# Patient Record
Sex: FEMALE | Race: WHITE | Employment: UNEMPLOYED | URBAN - METROPOLITAN AREA
[De-identification: names, ages, dates, MRNs, and addresses within clinical notes are randomized per-mention and may not be internally consistent; named-entity substitution may affect disease eponyms.]

---

## 2017-04-04 ENCOUNTER — OFFICE VISIT (OUTPATIENT)
Dept: ORTHOPEDIC SURGERY | Age: 77
End: 2017-04-04

## 2017-04-04 ENCOUNTER — ORTHOTIC/BRACE ENCOUNTER (OUTPATIENT)
Dept: ORTHOPEDIC SURGERY | Age: 77
End: 2017-04-04

## 2017-04-04 VITALS — HEIGHT: 63 IN | BODY MASS INDEX: 25.7 KG/M2 | WEIGHT: 145.06 LBS

## 2017-04-04 DIAGNOSIS — M17.11 PRIMARY OSTEOARTHRITIS OF RIGHT KNEE: Primary | ICD-10-CM

## 2017-04-04 PROCEDURE — 99213 OFFICE O/P EST LOW 20 MIN: CPT | Performed by: ORTHOPAEDIC SURGERY

## 2017-04-12 ENCOUNTER — OFFICE VISIT (OUTPATIENT)
Dept: ORTHOPEDIC SURGERY | Age: 77
End: 2017-04-12

## 2017-04-12 VITALS
DIASTOLIC BLOOD PRESSURE: 81 MMHG | HEART RATE: 65 BPM | SYSTOLIC BLOOD PRESSURE: 133 MMHG | WEIGHT: 145.06 LBS | HEIGHT: 63 IN | BODY MASS INDEX: 25.7 KG/M2

## 2017-04-12 DIAGNOSIS — M19.172 POST-TRAUMATIC OSTEOARTHRITIS OF LEFT ANKLE: Primary | ICD-10-CM

## 2017-04-12 PROCEDURE — 99213 OFFICE O/P EST LOW 20 MIN: CPT | Performed by: ORTHOPAEDIC SURGERY

## 2017-04-12 PROCEDURE — 20605 DRAIN/INJ JOINT/BURSA W/O US: CPT | Performed by: ORTHOPAEDIC SURGERY

## 2017-06-01 LAB
ANION GAP SERPL CALCULATED.3IONS-SCNC: 9.4 MMOL/L (ref 8–16)
BUN BLDV-MCNC: 11 MG/DL (ref 5–19)
CALCIUM SERPL-MCNC: 9.7 MG/DL (ref 8.4–10.2)
CHLORIDE BLD-SCNC: 99 MMOL/L (ref 99–111)
CO2: 31 MMOL/L (ref 21–33)
CREAT SERPL-MCNC: 0.8 MG/DL (ref 0.6–1.3)
ERYTHROCYTE DISTRIBUTION WIDTH RBC RATIO: 14.1 % (ref 11.6–14.8)
GFR CALCULATED: 70
GLUCOSE BLD-MCNC: 114 MG/DL (ref 74–99)
HCT VFR BLD CALC: 41.6 % (ref 35.7–46.7)
HEMOGLOBIN: 13.3 G/DL (ref 11.9–15.9)
MCH RBC QN AUTO: 27.8 PG (ref 28.1–33.6)
MCHC RBC AUTO-ENTMCNC: 32 G/DL (ref 32.8–34.7)
MCV RBC AUTO: 86.8 FL (ref 82.9–99.9)
PLATELETS: 275 X1000 (ref 175–420)
POTASSIUM SERPL-SCNC: 3.4 MMOL/L (ref 3.4–5)
RBC # BLD: 4.79 X1000000 (ref 3.72–5.31)
SODIUM BLD-SCNC: 136 MMOL/L (ref 136–146)
WBC # BLD: 9.3 X1000 (ref 4.5–10.3)

## 2017-06-03 LAB
Lab: NORMAL
URINE CULTURE, ROUTINE: NORMAL

## 2017-06-20 ENCOUNTER — PROCEDURE VISIT (OUTPATIENT)
Dept: ORTHOPEDIC SURGERY | Age: 77
End: 2017-06-20

## 2017-06-20 DIAGNOSIS — M17.0 PRIMARY OSTEOARTHRITIS OF BOTH KNEES: Primary | ICD-10-CM

## 2017-06-20 PROCEDURE — 99999 PR OFFICE/OUTPT VISIT,PROCEDURE ONLY: CPT | Performed by: ORTHOPAEDIC SURGERY

## 2017-06-26 ENCOUNTER — TELEPHONE (OUTPATIENT)
Dept: ORTHOPEDIC SURGERY | Age: 77
End: 2017-06-26

## 2017-07-05 ENCOUNTER — TELEPHONE (OUTPATIENT)
Dept: ORTHOPEDIC SURGERY | Age: 77
End: 2017-07-05

## 2017-07-05 LAB
ABO GROUPING: NORMAL
ANION GAP SERPL CALCULATED.3IONS-SCNC: 10 MMOL/L (ref 8–16)
ANTIBODY SCREEN: NEGATIVE
BUN BLDV-MCNC: 14 MG/DL (ref 5–19)
CALCIUM SERPL-MCNC: 9.8 MG/DL (ref 8.4–10.2)
CHLORIDE BLD-SCNC: 100 MMOL/L (ref 99–111)
CO2: 32 MMOL/L (ref 21–33)
CREAT SERPL-MCNC: 0.9 MG/DL (ref 0.6–1.3)
ERYTHROCYTE DISTRIBUTION WIDTH RBC RATIO: 13.9 % (ref 11.6–14.8)
GFR CALCULATED: 61
GLUCOSE BLD-MCNC: 123 MG/DL (ref 74–99)
HCT VFR BLD CALC: 38.8 % (ref 35.7–46.7)
HEMOGLOBIN: 12.7 G/DL (ref 11.9–15.9)
MCH RBC QN AUTO: 28.1 PG (ref 28.1–33.6)
MCHC RBC AUTO-ENTMCNC: 32.7 G/DL (ref 32.8–34.7)
MCV RBC AUTO: 85.8 FL (ref 82.9–99.9)
PLATELETS: 248 X1000 (ref 175–420)
POTASSIUM SERPL-SCNC: 3.4 MMOL/L (ref 3.4–5)
RBC # BLD: 4.52 X1000000 (ref 3.72–5.31)
SODIUM BLD-SCNC: 139 MMOL/L (ref 136–146)
WBC # BLD: 8.2 X1000 (ref 4.5–10.3)

## 2017-07-27 ENCOUNTER — OFFICE VISIT (OUTPATIENT)
Dept: ORTHOPEDIC SURGERY | Age: 77
End: 2017-07-27

## 2017-07-27 VITALS — WEIGHT: 145.06 LBS | BODY MASS INDEX: 25.7 KG/M2 | HEIGHT: 63 IN

## 2017-07-27 DIAGNOSIS — Z96.651 STATUS POST TOTAL RIGHT KNEE REPLACEMENT: Primary | ICD-10-CM

## 2017-07-27 PROCEDURE — 73560 X-RAY EXAM OF KNEE 1 OR 2: CPT | Performed by: ORTHOPAEDIC SURGERY

## 2017-07-27 PROCEDURE — 99024 POSTOP FOLLOW-UP VISIT: CPT | Performed by: ORTHOPAEDIC SURGERY

## 2017-07-27 RX ORDER — METHYLPREDNISOLONE 4 MG/1
TABLET ORAL
Qty: 1 KIT | Refills: 0 | Status: SHIPPED | OUTPATIENT
Start: 2017-07-27

## 2017-09-14 ENCOUNTER — OFFICE VISIT (OUTPATIENT)
Dept: ORTHOPEDIC SURGERY | Age: 77
End: 2017-09-14

## 2017-09-14 VITALS — BODY MASS INDEX: 25.7 KG/M2 | HEIGHT: 63 IN | WEIGHT: 145.06 LBS

## 2017-09-14 DIAGNOSIS — Z96.651 STATUS POST TOTAL KNEE REPLACEMENT, RIGHT: Primary | ICD-10-CM

## 2017-09-14 PROCEDURE — 99024 POSTOP FOLLOW-UP VISIT: CPT | Performed by: ORTHOPAEDIC SURGERY

## 2017-09-19 RX ORDER — CIPROFLOXACIN 500 MG/1
TABLET, FILM COATED ORAL
Qty: 10 TABLET | Refills: 0 | Status: SHIPPED | OUTPATIENT
Start: 2017-09-19 | End: 2019-04-03 | Stop reason: SDUPTHER

## 2018-06-12 ENCOUNTER — ORTHOTIC/BRACE ENCOUNTER (OUTPATIENT)
Dept: ORTHOPEDIC SURGERY | Age: 78
End: 2018-06-12

## 2018-06-26 ENCOUNTER — ORTHOTIC/BRACE ENCOUNTER (OUTPATIENT)
Dept: ORTHOPEDIC SURGERY | Age: 78
End: 2018-06-26

## 2018-06-27 ENCOUNTER — OFFICE VISIT (OUTPATIENT)
Dept: ORTHOPEDIC SURGERY | Age: 78
End: 2018-06-27

## 2018-06-27 VITALS
HEART RATE: 71 BPM | WEIGHT: 146 LBS | DIASTOLIC BLOOD PRESSURE: 87 MMHG | BODY MASS INDEX: 26.87 KG/M2 | SYSTOLIC BLOOD PRESSURE: 138 MMHG | HEIGHT: 62 IN

## 2018-06-27 DIAGNOSIS — M25.572 LEFT ANKLE PAIN, UNSPECIFIED CHRONICITY: Primary | ICD-10-CM

## 2018-06-27 DIAGNOSIS — M19.172 POST-TRAUMATIC OSTEOARTHRITIS OF LEFT ANKLE: ICD-10-CM

## 2018-06-27 PROCEDURE — L1902 AFO ANKLE GAUNTLET PRE OTS: HCPCS | Performed by: ORTHOPAEDIC SURGERY

## 2018-06-27 PROCEDURE — 20605 DRAIN/INJ JOINT/BURSA W/O US: CPT | Performed by: ORTHOPAEDIC SURGERY

## 2018-06-27 PROCEDURE — 99213 OFFICE O/P EST LOW 20 MIN: CPT | Performed by: ORTHOPAEDIC SURGERY

## 2019-04-03 DIAGNOSIS — Z96.651 STATUS POST TOTAL RIGHT KNEE REPLACEMENT: Primary | ICD-10-CM

## 2019-04-03 RX ORDER — CIPROFLOXACIN 500 MG/1
TABLET, FILM COATED ORAL
Qty: 10 TABLET | Refills: 1 | Status: SHIPPED | OUTPATIENT
Start: 2019-04-03

## 2019-08-06 ENCOUNTER — ORTHOTIC/BRACE ENCOUNTER (OUTPATIENT)
Dept: ORTHOPEDIC SURGERY | Age: 79
End: 2019-08-06

## 2019-08-07 ENCOUNTER — OFFICE VISIT (OUTPATIENT)
Dept: ORTHOPEDIC SURGERY | Age: 79
End: 2019-08-07
Payer: MEDICARE

## 2019-08-07 VITALS — WEIGHT: 145.94 LBS | HEIGHT: 62 IN | BODY MASS INDEX: 26.86 KG/M2

## 2019-08-07 DIAGNOSIS — M25.572 LEFT ANKLE PAIN, UNSPECIFIED CHRONICITY: Primary | ICD-10-CM

## 2019-08-07 PROCEDURE — 20605 DRAIN/INJ JOINT/BURSA W/O US: CPT | Performed by: ORTHOPAEDIC SURGERY

## 2019-08-07 PROCEDURE — 99213 OFFICE O/P EST LOW 20 MIN: CPT | Performed by: ORTHOPAEDIC SURGERY

## 2019-08-08 NOTE — PROGRESS NOTES
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 She has a history of breast cancer. States that her pain has recurred and it is around a 4 out of 10. The brace helps her enormously. When the brace is off it feels worse and when she wears it is much better. Currently rates her pain between a form of 10 out of 10 she is retired  Objective: Physical exam shows moderate effusion in the left ankle. Anterior drawer and talar tilt show no gross laxity sensations intact she has 10 degrees of dorsiflexion 10 degrees of plantarflexion with increased pain. Antalgic gait. Alert and oriented x3. Full range of motion of the right hip knee and ankle  Imaging: 3 views of the left ankle show end-stage arthritic change to the tibiotalar joint mild degenerative changes through the subtalar joint there is cystic formation in both the talus and tibia  Assessment and plan: We again reviewed the operative and nonoperative treatments.   Her operative treatment would be a TTC fusion versus nonoperative treatment which would be to continue her AFO I did inject her left ankle with Marcaine lidocaine and Kenalog 4/4/2 cc for posttraumatic arthritis of the ankle she tolerated this well and will follow-up with me as needed review of systems was performed based on the intake sheet dated 8/7/2019 and copy to the media section of her chart

## 2019-09-26 ENCOUNTER — OFFICE VISIT (OUTPATIENT)
Dept: ORTHOPEDIC SURGERY | Age: 79
End: 2019-09-26
Payer: MEDICARE

## 2019-09-26 VITALS — WEIGHT: 145.94 LBS | RESPIRATION RATE: 10 BRPM | BODY MASS INDEX: 26.86 KG/M2 | HEIGHT: 62 IN

## 2019-09-26 DIAGNOSIS — M25.561 ACUTE PAIN OF RIGHT KNEE: ICD-10-CM

## 2019-09-26 DIAGNOSIS — M25.551 PAIN OF RIGHT HIP JOINT: ICD-10-CM

## 2019-09-26 DIAGNOSIS — S70.11XA CONTUSION OF RIGHT THIGH, INITIAL ENCOUNTER: Primary | ICD-10-CM

## 2019-09-26 PROCEDURE — 99214 OFFICE O/P EST MOD 30 MIN: CPT | Performed by: ORTHOPAEDIC SURGERY

## 2019-09-30 DIAGNOSIS — Z96.651 STATUS POST TOTAL RIGHT KNEE REPLACEMENT: Primary | ICD-10-CM

## 2019-09-30 RX ORDER — CIPROFLOXACIN 500 MG/1
500 TABLET, FILM COATED ORAL 2 TIMES DAILY
Qty: 20 TABLET | Refills: 0 | Status: SHIPPED | OUTPATIENT
Start: 2019-09-30 | End: 2019-10-10

## 2020-09-14 RX ORDER — CIPROFLOXACIN 500 MG/1
TABLET, FILM COATED ORAL
Qty: 10 TABLET | Refills: 0 | Status: SHIPPED | OUTPATIENT
Start: 2020-09-14 | End: 2021-07-29 | Stop reason: SDUPTHER

## 2021-04-19 ENCOUNTER — TELEPHONE (OUTPATIENT)
Dept: ORTHOPEDIC SURGERY | Age: 81
End: 2021-04-19

## 2021-04-19 NOTE — TELEPHONE ENCOUNTER
I'm not sure what this patient wants, but she is not a patient of Dr. Manoj Redman. She will need an appointment with foot or ankle, Dr. Cindy Fountain maybe.

## 2021-07-29 ENCOUNTER — TELEPHONE (OUTPATIENT)
Dept: ORTHOPEDIC SURGERY | Age: 81
End: 2021-07-29

## 2021-07-29 DIAGNOSIS — Z96.651 STATUS POST TOTAL RIGHT KNEE REPLACEMENT: ICD-10-CM

## 2021-07-29 RX ORDER — CIPROFLOXACIN 500 MG/1
TABLET, FILM COATED ORAL
Qty: 10 TABLET | Refills: 0 | Status: SHIPPED | OUTPATIENT
Start: 2021-07-29

## 2023-07-05 NOTE — TELEPHONE ENCOUNTER
Had Colonoscopy at [facility] in 2015 ( internal hem and Rectal Polyp removed) - due in 2025  Due for Shingles vaccine, Hep B vaccine series, and COVID booster   Schedule MWV + with PCP      REQ ANTIBIOTIC, GETTING IMPLANT TOMORROW